# Patient Record
Sex: MALE | Race: WHITE | NOT HISPANIC OR LATINO | Employment: OTHER | URBAN - NONMETROPOLITAN AREA
[De-identification: names, ages, dates, MRNs, and addresses within clinical notes are randomized per-mention and may not be internally consistent; named-entity substitution may affect disease eponyms.]

---

## 2023-07-08 ENCOUNTER — APPOINTMENT (EMERGENCY)
Dept: CT IMAGING | Facility: HOSPITAL | Age: 78
End: 2023-07-08
Payer: COMMERCIAL

## 2023-07-08 ENCOUNTER — HOSPITAL ENCOUNTER (EMERGENCY)
Facility: HOSPITAL | Age: 78
Discharge: OTHER INSTITUTION | End: 2023-07-08
Attending: STUDENT IN AN ORGANIZED HEALTH CARE EDUCATION/TRAINING PROGRAM
Payer: COMMERCIAL

## 2023-07-08 VITALS
SYSTOLIC BLOOD PRESSURE: 164 MMHG | DIASTOLIC BLOOD PRESSURE: 95 MMHG | HEART RATE: 81 BPM | WEIGHT: 167.77 LBS | BODY MASS INDEX: 24.02 KG/M2 | HEIGHT: 70 IN | TEMPERATURE: 98.2 F | RESPIRATION RATE: 26 BRPM | OXYGEN SATURATION: 99 %

## 2023-07-08 DIAGNOSIS — S01.81XA LACERATION OF FOREHEAD, INITIAL ENCOUNTER: ICD-10-CM

## 2023-07-08 DIAGNOSIS — I60.9 SUBARACHNOID HEMORRHAGE (HCC): Primary | ICD-10-CM

## 2023-07-08 DIAGNOSIS — S14.109A INJURY OF CERVICAL SPINE, INITIAL ENCOUNTER (HCC): ICD-10-CM

## 2023-07-08 DIAGNOSIS — S00.12XA PERIORBITAL ECCHYMOSIS OF LEFT EYE, INITIAL ENCOUNTER: ICD-10-CM

## 2023-07-08 DIAGNOSIS — S02.0XXA CLOSED FRACTURE OF FRONTAL BONE, INITIAL ENCOUNTER (HCC): ICD-10-CM

## 2023-07-08 LAB
ANION GAP SERPL CALCULATED.3IONS-SCNC: 8 MMOL/L
BASOPHILS # BLD MANUAL: 0 THOUSAND/UL (ref 0–0.1)
BASOPHILS NFR MAR MANUAL: 0 % (ref 0–1)
BUN SERPL-MCNC: 24 MG/DL (ref 5–25)
CALCIUM SERPL-MCNC: 9.3 MG/DL (ref 8.4–10.2)
CARDIAC TROPONIN I PNL SERPL HS: 5 NG/L
CHLORIDE SERPL-SCNC: 103 MMOL/L (ref 96–108)
CO2 SERPL-SCNC: 29 MMOL/L (ref 21–32)
CREAT SERPL-MCNC: 0.91 MG/DL (ref 0.6–1.3)
EOSINOPHIL # BLD MANUAL: 0.22 THOUSAND/UL (ref 0–0.4)
EOSINOPHIL NFR BLD MANUAL: 4 % (ref 0–6)
ERYTHROCYTE [DISTWIDTH] IN BLOOD BY AUTOMATED COUNT: 12.6 % (ref 11.6–15.1)
GFR SERPL CREATININE-BSD FRML MDRD: 80 ML/MIN/1.73SQ M
GLUCOSE SERPL-MCNC: 94 MG/DL (ref 65–140)
HCT VFR BLD AUTO: 37.8 % (ref 36.5–49.3)
HGB BLD-MCNC: 13 G/DL (ref 12–17)
INR PPP: 1.05 (ref 0.84–1.19)
LYMPHOCYTES # BLD AUTO: 1.05 THOUSAND/UL (ref 0.6–4.47)
LYMPHOCYTES # BLD AUTO: 19 % (ref 14–44)
MCH RBC QN AUTO: 33.9 PG (ref 26.8–34.3)
MCHC RBC AUTO-ENTMCNC: 34.4 G/DL (ref 31.4–37.4)
MCV RBC AUTO: 98 FL (ref 82–98)
MONOCYTES # BLD AUTO: 0.83 THOUSAND/UL (ref 0–1.22)
MONOCYTES NFR BLD: 15 % (ref 4–12)
NEUTROPHILS # BLD MANUAL: 3.42 THOUSAND/UL (ref 1.85–7.62)
NEUTS BAND NFR BLD MANUAL: 3 % (ref 0–8)
NEUTS SEG NFR BLD AUTO: 59 % (ref 43–75)
PLATELET # BLD AUTO: 175 THOUSANDS/UL (ref 149–390)
PLATELET BLD QL SMEAR: ADEQUATE
PMV BLD AUTO: 9.2 FL (ref 8.9–12.7)
POTASSIUM SERPL-SCNC: 3.5 MMOL/L (ref 3.5–5.3)
PROTHROMBIN TIME: 13.9 SECONDS (ref 11.6–14.5)
RBC # BLD AUTO: 3.84 MILLION/UL (ref 3.88–5.62)
RBC MORPH BLD: NORMAL
SODIUM SERPL-SCNC: 140 MMOL/L (ref 135–147)
WBC # BLD AUTO: 5.52 THOUSAND/UL (ref 4.31–10.16)

## 2023-07-08 PROCEDURE — 93005 ELECTROCARDIOGRAM TRACING: CPT

## 2023-07-08 PROCEDURE — 85027 COMPLETE CBC AUTOMATED: CPT | Performed by: STUDENT IN AN ORGANIZED HEALTH CARE EDUCATION/TRAINING PROGRAM

## 2023-07-08 PROCEDURE — 99285 EMERGENCY DEPT VISIT HI MDM: CPT

## 2023-07-08 PROCEDURE — 70450 CT HEAD/BRAIN W/O DYE: CPT

## 2023-07-08 PROCEDURE — 36415 COLL VENOUS BLD VENIPUNCTURE: CPT | Performed by: STUDENT IN AN ORGANIZED HEALTH CARE EDUCATION/TRAINING PROGRAM

## 2023-07-08 PROCEDURE — 72125 CT NECK SPINE W/O DYE: CPT

## 2023-07-08 PROCEDURE — 84484 ASSAY OF TROPONIN QUANT: CPT | Performed by: STUDENT IN AN ORGANIZED HEALTH CARE EDUCATION/TRAINING PROGRAM

## 2023-07-08 PROCEDURE — G1004 CDSM NDSC: HCPCS

## 2023-07-08 PROCEDURE — 80048 BASIC METABOLIC PNL TOTAL CA: CPT | Performed by: STUDENT IN AN ORGANIZED HEALTH CARE EDUCATION/TRAINING PROGRAM

## 2023-07-08 PROCEDURE — 85610 PROTHROMBIN TIME: CPT | Performed by: STUDENT IN AN ORGANIZED HEALTH CARE EDUCATION/TRAINING PROGRAM

## 2023-07-08 PROCEDURE — 70486 CT MAXILLOFACIAL W/O DYE: CPT

## 2023-07-08 PROCEDURE — 85007 BL SMEAR W/DIFF WBC COUNT: CPT | Performed by: STUDENT IN AN ORGANIZED HEALTH CARE EDUCATION/TRAINING PROGRAM

## 2023-07-08 RX ORDER — ATORVASTATIN CALCIUM 40 MG/1
40 TABLET, FILM COATED ORAL DAILY
COMMUNITY

## 2023-07-08 RX ORDER — DIPHENOXYLATE HYDROCHLORIDE AND ATROPINE SULFATE 2.5; .025 MG/1; MG/1
1 TABLET ORAL DAILY
COMMUNITY

## 2023-07-08 RX ORDER — FLUTICASONE PROPIONATE 50 MCG
1 SPRAY, SUSPENSION (ML) NASAL DAILY
COMMUNITY

## 2023-07-08 RX ORDER — LISINOPRIL AND HYDROCHLOROTHIAZIDE 25; 20 MG/1; MG/1
1 TABLET ORAL DAILY
COMMUNITY

## 2023-07-08 RX ORDER — CARBAMAZEPINE 100 MG/1
100 TABLET, CHEWABLE ORAL 3 TIMES DAILY
COMMUNITY

## 2023-07-08 RX ORDER — PANTOPRAZOLE SODIUM 40 MG/1
40 TABLET, DELAYED RELEASE ORAL DAILY
COMMUNITY

## 2023-07-08 RX ORDER — LORATADINE 10 MG/1
10 TABLET ORAL DAILY
COMMUNITY

## 2023-07-08 RX ORDER — LEVETIRACETAM 250 MG/1
250 TABLET ORAL 2 TIMES DAILY
COMMUNITY

## 2023-07-08 RX ORDER — TAMSULOSIN HYDROCHLORIDE 0.4 MG/1
0.4 CAPSULE ORAL
COMMUNITY

## 2023-07-08 NOTE — ED PROVIDER NOTES
History  Chief Complaint   Patient presents with   • Fall                Patient presents to the ED via ambulance post fall outside of a store. Per EMS, the patient was exiting a store when he became dizzy and fell, hitting his left side of his off of the a concrete curb. The patient denies LOC, remembers falling. No thinners noted. Patient moves all extremities without difficulty. Pupils equal and reactive. History provided by:  Patient and spouse  Fall  Mechanism of injury: fall    Mechanism of injury comment:  Was walking into a store when he developed dizziness/lightheaded and fell. Struck his left head/face. Denies LOC. No AC/AP medications. Denies all other injuries/pain. No other prodromal symptoms. Injury location:  Head/neck and face  Head/neck injury location:  Head  Facial injury location:  Face  Incident location:  Outdoors  Time since incident:  30 minutes  Arrived directly from scene: yes    Fall:     Fall occurred:  Walking    Impact surface:  [place]    Point of impact:  Head and face  Suspicion of alcohol use: no    Suspicion of drug use: no    Tetanus status:  Unknown  Associated symptoms: no abdominal pain, no back pain, no chest pain, no difficulty breathing, no headaches, no loss of consciousness, no nausea, no neck pain and no vomiting    Risk factors comment:  History of seizures, COPD    Past Medical History:   Diagnosis Date   • COPD (chronic obstructive pulmonary disease) ([address])    • Lymphoma ([address])    • Seizure ([address])     No seizure in approximately 10 years       Past Surgical History:   Procedure Laterality Date   • CHOLECYSTECTOMY         History reviewed. No pertinent family history. I have reviewed and agree with the history as documented.     E-Cigarette/Vaping   • E-Cigarette Use Former User      E-Cigarette/Vaping Substances     Social History     Tobacco Use   • Smoking status: Former     Types: Cigarettes   • Smokeless tobacco: Never   Vaping Use   • Vaping Use: Former   Substance Use Topics   • Drug use: Never     Review of Systems   Constitutional: Negative for activity change, appetite change, chills, diaphoresis, fatigue and fever. HENT: Negative for congestion, dental problem, facial swelling, rhinorrhea, sinus pressure, sinus pain, sore throat, trouble swallowing and voice change. Eyes: Negative for photophobia, pain, discharge, redness, itching and visual disturbance. Respiratory: Negative for cough, chest tightness, shortness of breath and wheezing. Cardiovascular: Negative for chest pain, palpitations and leg swelling. Gastrointestinal: Negative for abdominal distention, abdominal pain, constipation, diarrhea, nausea and vomiting. Genitourinary: Negative for decreased urine volume, difficulty urinating, dysuria, flank pain, frequency and urgency. Musculoskeletal: Positive for gait problem. Negative for arthralgias, back pain, myalgias, neck pain and neck stiffness. Skin: Negative for color change, pallor, rash and wound. Neurological: Positive for light-headedness. Negative for dizziness, loss of consciousness, syncope, facial asymmetry, speech difficulty, weakness, numbness and headaches. Hematological: Does not bruise/bleed easily. Psychiatric/Behavioral: Negative for agitation, confusion and decreased concentration. All other systems reviewed and are negative. Physical Exam  Physical Exam  Vitals and nursing note reviewed. Constitutional:       General: He is not in acute distress. Appearance: He is not ill-appearing or toxic-appearing. HENT:      Head: Normocephalic. Comments: Left periorbital ecchymosis. Laceration along the left forehead     Right Ear: Tympanic membrane, ear canal and external ear normal. There is no impacted cerumen. Left Ear: Tympanic membrane, ear canal and external ear normal. There is no impacted cerumen.       Ears:      Comments: No signs of hemotympanum     Nose: No congestion or rhinorrhea. Mouth/Throat:      Mouth: Mucous membranes are moist.      Pharynx: Oropharynx is clear. No oropharyngeal exudate or posterior oropharyngeal erythema. Comments: No signs of oral/dental injury  Eyes:      General: No scleral icterus. Right eye: No discharge. Left eye: No discharge. Extraocular Movements: Extraocular movements intact. Conjunctiva/sclera: Conjunctivae normal.   Neck:      Comments: Cervical collar applied  Cardiovascular:      Rate and Rhythm: Normal rate. Pulses: Normal pulses. Heart sounds: Normal heart sounds. Pulmonary:      Effort: Pulmonary effort is normal. No respiratory distress. Breath sounds: Normal breath sounds. No stridor. No wheezing, rhonchi or rales. Chest:      Chest wall: No tenderness. Abdominal:      General: Bowel sounds are normal.      Tenderness: There is no abdominal tenderness. There is no right CVA tenderness, left CVA tenderness, guarding or rebound. Musculoskeletal:         General: Tenderness and signs of injury present. Cervical back: Neck supple. No tenderness. Right lower leg: No edema. Left lower leg: No edema. Comments: Bilateral patellar abrasions   Skin:     General: Skin is warm and dry. Capillary Refill: Capillary refill takes less than 2 seconds. Coloration: Skin is not jaundiced or pale. Findings: Bruising present. No erythema, lesion or rash. Neurological:      General: No focal deficit present. Mental Status: He is alert and oriented to person, place, and time. Mental status is at baseline. Cranial Nerves: No cranial nerve deficit. Sensory: No sensory deficit. Motor: No weakness. Psychiatric:         Mood and Affect: Mood normal.         Behavior: Behavior normal.         Thought Content:  Thought content normal.         Judgment: Judgment normal.       Vital Signs  ED Triage Vitals [07/08/23 1255]   Temperature Pulse Respirations Blood Pressure SpO2   98.2 °F (36.8 °C) 86 22 141/77 95 %      Temp Source Heart Rate Source Patient Position - Orthostatic VS BP Location FiO2 (%)   Temporal Monitor Lying Left arm --      Pain Score       8           Vitals:    07/08/23 1255   BP: 141/77   Pulse: 86   Patient Position - Orthostatic VS: Lying     Visual Acuity  Visual Acuity    Flowsheet Row Most Recent Value   L Pupil Size (mm) 3   R Pupil Size (mm) 3        ED Medications  Medications   tetanus-diphtheria-acellular pertussis (BOOSTRIX) IM injection 0.5 mL (has no administration in time range)     Diagnostic Studies  Results Reviewed     Procedure Component Value Units Date/Time    HS Troponin I 4hr [[id number]]     Lab Status: No result Specimen: Blood     RBC Morphology Reflex Test [[id number]] Collected: 07/08/23 1308    Lab Status: Final result Specimen: Blood from Arm, Right Updated: 07/08/23 1401    CBC and differential [[id number]]  (Abnormal) Collected: 07/08/23 1308    Lab Status: Final result Specimen: Blood from Arm, Right Updated: 07/08/23 1348     WBC 5.52 Thousand/uL      RBC 3.84 Million/uL      Hemoglobin 13.0 g/dL      Hematocrit 37.8 %      MCV 98 fL      MCH 33.9 pg      MCHC 34.4 g/dL      RDW 12.6 %      MPV 9.2 fL      Platelets 489 Thousands/uL     Narrative: This is an appended report. These results have been appended to a previously verified report.     Manual Differential(PHLEBS Do Not Order) [[id number]]  (Abnormal) Collected: 07/08/23 1308    Lab Status: Final result Specimen: Blood from Arm, Right Updated: 07/08/23 1348     Segmented % 59 %      Bands % 3 %      Lymphocytes % 19 %      Monocytes % 15 %      Eosinophils, % 4 %      Basophils % 0 %      Absolute Neutrophils 3.42 Thousand/uL      Lymphocytes Absolute 1.05 Thousand/uL      Monocytes Absolute 0.83 Thousand/uL      Eosinophils Absolute 0.22 Thousand/uL      Basophils Absolute 0.00 Thousand/uL      Total Counted --     RBC Morphology Normal     Platelet Estimate Adequate    HS Troponin I 2hr [[id number]]     Lab Status: No result Specimen: Blood     HS Troponin 0hr (reflex protocol) [[id number]]  (Normal) Collected: 07/08/23 1308    Lab Status: Final result Specimen: Blood from Arm, Right Updated: 07/08/23 1343     hs TnI 0hr 5 ng/L     Basic metabolic panel [[id number]] Collected: 07/08/23 1308    Lab Status: Final result Specimen: Blood from Arm, Right Updated: 07/08/23 1335     Sodium 140 mmol/L      Potassium 3.5 mmol/L      Chloride 103 mmol/L      CO2 29 mmol/L      ANION GAP 8 mmol/L      BUN 24 mg/dL      Creatinine 0.91 mg/dL      Glucose 94 mg/dL      Calcium 9.3 mg/dL      eGFR 80 ml/min/1.73sq m     Narrative:      [place] guidelines for Chronic Kidney Disease (CKD):   •  Stage 1 with normal or high GFR (GFR > 90 mL/min/1.73 square meters)  •  Stage 2 Mild CKD (GFR = 60-89 mL/min/1.73 square meters)  •  Stage 3A Moderate CKD (GFR = 45-59 mL/min/1.73 square meters)  •  Stage 3B Moderate CKD (GFR = 30-44 mL/min/1.73 square meters)  •  Stage 4 Severe CKD (GFR = 15-29 mL/min/1.73 square meters)  •  Stage 5 End Stage CKD (GFR <15 mL/min/1.73 square meters)  Note: GFR calculation is accurate only with a steady state creatinine    Protime-INR [[id number]]  (Normal) Collected: 07/08/23 1308    Lab Status: Final result Specimen: Blood from Arm, Right Updated: 07/08/23 1330     Protime 13.9 seconds      INR 1.05             CT head without contrast   Final Result by [facility],  (07/08 1400)      Trace subarachnoid hemorrhage inferior frontal lobe sulci bilaterally. Left frontal bone fracture is nondisplaced extending to involve the left orbital roof. Adjacent soft tissue swelling in the subcutaneous tissues with laceration noted.       Postoperative changes of frontoparietal vertex craniotomy eccentric to the left with encephalomalacia and volume loss in the paramedian posterior left frontal lobe to involve the anterior body of the corpus callosum. I personally discussed this study with Narendra Stephen on 7/8/2023 1:49 PM.                        Workstation performed: [id number]         CT spine cervical without contrast   Final Result by [facility] [address]  (07/08 1400)      Widening of the anterior disc space at C4-5 identified without fracture, and without focal soft tissue swelling. This amount of widening is suggestive anterior to longitudinal ligament injury although of indeterminate age. Spondylotic degenerative changes are noted as described. I personally discussed this study with Narendra Stephen on 7/8/2023 1:49 PM.                        Workstation performed: [id number]         CT facial bones without contrast   Final Result by [employer] [facility] [address],  (07/08 1403)      Nondisplaced left frontal bone fracture extends into the left orbital roof. No intra or extraconal hematoma identified. I personally discussed this study with Narendra Stephen on 7/8/2023 2 1:49 PM.               Workstation performed: [id number]                Procedures  CriticalCare Time    Date/Time: 7/8/2023 3:58 PM    Performed by: Narendra Stephen DO  Authorized by:  Narendra Stephen DO    Critical care provider statement:     Critical care time (minutes):  40    Critical care was necessary to treat or prevent imminent or life-threatening deterioration of the following conditions:  Trauma    Critical care was time spent personally by me on the following activities:  Blood draw for specimens, obtaining history from patient or surrogate, development of treatment plan with patient or surrogate, discussions with consultants, evaluation of patient's response to treatment, examination of patient, review of old charts, re-evaluation of patient's condition, ordering and review of radiographic studies, ordering and review of laboratory studies and ordering and performing treatments and interventions  ECG 12 Lead Documentation Only    Date/Time: 7/8/2023 12:41 PM    Performed by: Jil Perdomo DO  Authorized by: Jil Perdomo DO    Indications / Diagnosis:  Lightheadedness   ECG reviewed by me, the ED Provider: yes    Patient location:  ED  Previous ECG:     Previous ECG:  Unavailable  Interpretation:     Interpretation: normal    Rate:     ECG rate:  89    ECG rate assessment: normal    Rhythm:     Rhythm: sinus rhythm    Ectopy:     Ectopy: none    QRS:     QRS axis:  Normal    QRS intervals:  Normal  Conduction:     Conduction: normal    ST segments:     ST segments:  Normal  T waves:     T waves: normal           ED Course  ED Course as of 07/08/23 1536   Sat Jul 08, 2023   1327 CT head concerning for subarachnoid hemorrhage. The patient and his wife were offered transfer to either [facility] or [address] [place] [address]. [facility] elected. 1353 Received a phone call from radiology. Addition to the subarachnoid hemorrhage, the patient sustained a left frontal bone fracture extending into the orbital wall. Possible cervical spine injury. Upon reevaluation, the patient expresses mild left-sided neck pain. Upper extremities are neurovascularly intact. Symmetric/strong strength. Medical Decision Making  The differential diagnoses include but are not limited to subarachnoid hemorrhage, subdural hematoma, cervical spine fracture, concussion  Vital signs reviewed. The patient was awake/alert/oriented upon arrival.  Moving all extremities spontaneously. CT imaging interpretation above. Denies AC/AP use. Transfer accepted by Dr. Natanael Ash at [address] [place] [address]. Tetanus booster updated. Mental status and vital signs remained stable throughout the course of treatment. Transported by .      Closed fracture of frontal bone, initial encounter [facility]): acute illness or injury  Injury of cervical spine, initial encounter [facility]): acute illness or injury  Laceration of forehead, initial encounter: acute illness or injury  Periorbital ecchymosis of left eye, initial encounter: acute illness or injury  Subarachnoid hemorrhage [facility]): acute illness or injury  Amount and/or Complexity of Data Reviewed  Labs: ordered. Decision-making details documented in ED Course. Radiology: ordered and independent interpretation performed. Decision-making details documented in ED Course. Risk  Prescription drug management. Disposition  Final diagnoses:   Subarachnoid hemorrhage ([address])   Closed fracture of frontal bone, initial encounter ([address])   Injury of cervical spine, initial encounter ([address])   Periorbital ecchymosis of left eye, initial encounter   Laceration of forehead, initial encounter     Time reflects when diagnosis was documented in both MDM as applicable and the Disposition within this note     Time User Action Codes Description Comment    7/8/2023  2:00 PM Binh Obriene Add [I60.9] Subarachnoid hemorrhage ([address])     7/8/2023  2:00 PM Purcell Neve Add [S02. 0XXA] Closed fracture of frontal bone, initial encounter ([address])     7/8/2023  2:00 PM Purcell Neve Add [F15.166Y] Injury of cervical spine, initial encounter ([address])     7/8/2023  2:00 PM Purcell Neve Add [S00.12XA] Periorbital ecchymosis of left eye, initial encounter     7/8/2023  2:01 PM Arthur Velásquez Add [S01.81XA] Laceration of forehead, initial encounter       ED Disposition     ED Disposition   Transfer to Another [address] [place] [address]    Condition   --    Date/Time   Sat Jul 8, 2023  2:00 PM    Comment   Colt Vaca should be transferred out to [facility].            MD Documentation    Rashid Robertson Most Recent Value   Patient Condition The patient has been stabilized such that within reasonable medical probability, no material deterioration of the patient condition or the condition of the unborn child(ashley) is likely to result from the transfer   Reason for Transfer Level of Care needed not available at this facility   Benefits of Transfer Specialized equipment and/or services available at the receiving facility (Include comment)________________________   Risks of Transfer Potential for delay in receiving treatment   Accepting Physician Dr. Kaylee Wooten Name, [address] [place] [address]   Sending MD Tariq Og Name, [address] [place] [address]   Transport Mode Ambulance   Level of Care Advanced life support      Follow-up Information    None         Patient's Medications    No medications on file       No discharge procedures on file.     PDMP Review     None          ED Provider  Electronically Signed by           Hong Harris DO  07/08/23 5013

## 2023-07-08 NOTE — ED TRIAGE NOTES
Patient presents to the ED via ambulance post fall outside of a store. Per EMS, the patient was exiting a store when he became dizzy and fell, hitting his left side of his off of the a concrete curb. The patient denies LOC, remembers falling. No thinners noted. Patient moves all extremities without difficulty. Pupils equal and reactive.

## 2023-07-08 NOTE — ED NOTES
Report called to Trista Lemos. This nurse spoke with Milad Durand. Flight crew arrived, report given. The patient left this facility at [phone] in stable condition.       Lacy Nicolas RN  07/08/23 0005

## 2023-07-08 NOTE — EMTALA/ACUTE CARE TRANSFER
[address] [address]  Dept: [phone]      EMTALA TRANSFER CONSENT    NAME Thomas Gonzalez                                         1945                              MRN [id number]    I have been informed of my rights regarding examination, treatment, and transfer   by Dr. Yahir Dhillon DO    Benefits: Specialized equipment and/or services available at the receiving facility (Include comment)________________________    Risks: Potential for delay in receiving treatment      Consent for Transfer:  I acknowledge that my medical condition has been evaluated and explained to me by the emergency department physician or other qualified medical person and/or my attending physician, who has recommended that I be transferred to the service of  Accepting Physician: Dr. Tia Dias at [address] [place] [address] [address] Name, [facility] : [facility]. The above potential benefits of such transfer, the potential risks associated with such transfer, and the probable risks of not being transferred have been explained to me, and I fully understand them. The doctor has explained that, in my case, the benefits of transfer outweigh the risks. I agree to be transferred. I authorize the performance of emergency medical procedures and treatments upon me in both transit and upon arrival at the receiving facility. Additionally, I authorize the release of any and all medical records to the receiving facility and request they be transported with me, if possible. I understand that the safest mode of transportation during a medical emergency is an ambulance and that the Hospital advocates the use of this mode of transport. Risks of traveling to the receiving facility by car, including absence of medical control, life sustaining equipment, such as oxygen, and medical personnel has been explained to me and I fully understand them.     (KATHY CORRECT BOX BELOW)  [  ]  I consent to the stated transfer and to be transported by ambulance/helicopter. [  ]  I consent to the stated transfer, but refuse transportation by ambulance and accept full responsibility for my transportation by car. I understand the risks of non-ambulance transfers and I exonerate the Hospital and its staff from any deterioration in my condition that results from this refusal.    X___________________________________________    DATE  23  TIME________  Signature of patient or legally responsible individual signing on patient behalf           RELATIONSHIP TO PATIENT_________________________          Provider Certification    NAME Myrna La                                         1945                              MRN [id number]    A medical screening exam was performed on the above named patient. Based on the examination:    Condition Necessitating Transfer The primary encounter diagnosis was Subarachnoid hemorrhage ([address]). Diagnoses of Closed fracture of frontal bone, initial encounter ([address]), Injury of cervical spine, initial encounter ([address]), Periorbital ecchymosis of left eye, initial encounter, and Laceration of forehead, initial encounter were also pertinent to this visit.     Patient Condition: The patient has been stabilized such that within reasonable medical probability, no material deterioration of the patient condition or the condition of the unborn child(ashley) is likely to result from the transfer    Reason for Transfer: Level of Care needed not available at this facility    Transfer Requirements: Facility [facility]   · Space available and qualified personnel available for treatment as acknowledged by    · Agreed to accept transfer and to provide appropriate medical treatment as acknowledged by       Dr. Johns   · Appropriate medical records of the examination and treatment of the patient are provided at the time of transfer   [address] _______  · Transfer will be performed by qualified personnel from    and appropriate transfer equipment as required, including the use of necessary and appropriate life support measures. Provider Certification: I have examined the patient and explained the following risks and benefits of being transferred/refusing transfer to the patient/family:         Based on these reasonable risks and benefits to the patient and/or the unborn child(ashley), and based upon the information available at the time of the patient’s examination, I certify that the medical benefits reasonably to be expected from the provision of appropriate medical treatments at another medical facility outweigh the increasing risks, if any, to the individual’s medical condition, and in the case of labor to the unborn child, from effecting the transfer.     X____________________________________________ DATE 07/08/23        TIME_______      ORIGINAL - SEND TO MEDICAL RECORDS   COPY - SEND WITH PATIENT DURING TRANSFER

## 2023-07-10 LAB
ATRIAL RATE: 89 BPM
P AXIS: 28 DEGREES
PR INTERVAL: 196 MS
QRS AXIS: -8 DEGREES
QRSD INTERVAL: 74 MS
QT INTERVAL: 370 MS
QTC INTERVAL: 450 MS
T WAVE AXIS: 24 DEGREES
VENTRICULAR RATE: 89 BPM